# Patient Record
Sex: FEMALE | Race: WHITE | NOT HISPANIC OR LATINO | Employment: FULL TIME | URBAN - METROPOLITAN AREA
[De-identification: names, ages, dates, MRNs, and addresses within clinical notes are randomized per-mention and may not be internally consistent; named-entity substitution may affect disease eponyms.]

---

## 2017-12-13 ENCOUNTER — OFFICE VISIT (OUTPATIENT)
Dept: FAMILY MEDICINE CLINIC | Facility: CLINIC | Age: 27
End: 2017-12-13

## 2017-12-13 VITALS
RESPIRATION RATE: 16 BRPM | SYSTOLIC BLOOD PRESSURE: 106 MMHG | WEIGHT: 191 LBS | HEIGHT: 65 IN | OXYGEN SATURATION: 99 % | TEMPERATURE: 98.5 F | BODY MASS INDEX: 31.82 KG/M2 | DIASTOLIC BLOOD PRESSURE: 70 MMHG | HEART RATE: 68 BPM

## 2017-12-13 DIAGNOSIS — M79.642 BILATERAL HAND PAIN: Primary | ICD-10-CM

## 2017-12-13 DIAGNOSIS — M79.641 BILATERAL HAND PAIN: Primary | ICD-10-CM

## 2017-12-13 PROCEDURE — 99214 OFFICE O/P EST MOD 30 MIN: CPT | Performed by: FAMILY MEDICINE

## 2017-12-13 RX ORDER — MELOXICAM 7.5 MG/1
7.5 TABLET ORAL DAILY
Qty: 30 TABLET | Refills: 0 | Status: SHIPPED | OUTPATIENT
Start: 2017-12-13 | End: 2018-01-22 | Stop reason: SDUPTHER

## 2017-12-13 RX ORDER — ESCITALOPRAM OXALATE 20 MG/1
TABLET ORAL
COMMUNITY
Start: 2017-11-15

## 2017-12-13 RX ORDER — SUCRALFATE 1 G/1
TABLET ORAL
COMMUNITY
Start: 2017-11-11 | End: 2017-12-13

## 2017-12-13 NOTE — PROGRESS NOTES
"Subjective   Nadiya Iglesias is a 27 y.o. female.     History of Present Illness   Chief Complaint:   Chief Complaint   Patient presents with   • Hand Pain     Bilateral       Nadiya Iglesias 27 y.o. female who presents today for bilateral hand pain that has been present for 3-4 weeks. Her pain in the office is a 7 out of 10. She denies injury. She complains of swelling in both hands. It began in her right hand and now it is in both.    Mostly wrist   Will do labs and recheck 2 weeks  she has a problem list of   Patient Active Problem List   Diagnosis   • Fatigue   • LFT elevation   • Infectious mononucleosis   • SOB (shortness of breath)   • SALINAS (dyspnea on exertion)   • Sinus tachycardia   • Acute sinusitis   • Weakness   • Drugs and medicinal substances causing adverse effect in therapeutic use   • Asthma   .  Since the last visit, she has overall felt well.  she has been compliant with   Current Outpatient Prescriptions:   •  escitalopram (LEXAPRO) 20 MG tablet, , Disp: , Rfl:   •  PROAIR  (90 BASE) MCG/ACT inhaler, , Disp: , Rfl:   •  sucralfate (CARAFATE) 1 g tablet, , Disp: , Rfl: .  she denies medication side effects.    All of the chronic condition(s) listed above are stable w/o issues.    /70  Pulse 68  Temp 98.5 °F (36.9 °C) (Oral)   Resp 16  Ht 165.1 cm (65\")  Wt 86.6 kg (191 lb)  SpO2 99%  BMI 31.78 kg/m2    No results found for this or any previous visit.    The following portions of the patient's history were reviewed and updated as appropriate: allergies, current medications, past family history, past medical history, past social history, past surgical history and problem list.    Review of Systems   Constitutional: Negative for activity change, appetite change and unexpected weight change.   Eyes: Negative for visual disturbance.   Respiratory: Negative for chest tightness and shortness of breath.    Cardiovascular: Negative for chest pain and palpitations. "   Musculoskeletal:        Bilateral hand pain with swelling   Skin: Negative for color change.   Neurological: Negative for syncope and speech difficulty.   Psychiatric/Behavioral: Negative for confusion and decreased concentration.       Objective   Physical Exam   HENT:   Head: Normocephalic and atraumatic.   Eyes: EOM are normal. Pupils are equal, round, and reactive to light.   Neck: Normal range of motion. Neck supple.   Abdominal: Soft.   Musculoskeletal: Normal range of motion. She exhibits tenderness. She exhibits no edema or deformity.   Tender both wrists   from   Nursing note and vitals reviewed.      Assessment/Plan   Nadiya was seen today for hand pain and edema.    Diagnoses and all orders for this visit:    Bilateral hand pain  -     Comprehensive Metabolic Panel  -     Uric Acid  -     Rheumatoid Factor, Quant  -     QUYNH  -     Sedimentation Rate  -     meloxicam (MOBIC) 7.5 MG tablet; Take 1 tablet by mouth Daily.

## 2017-12-14 LAB
ALBUMIN SERPL-MCNC: 4.3 G/DL (ref 3.5–5.2)
ALBUMIN/GLOB SERPL: 1.6 G/DL
ALP SERPL-CCNC: 73 U/L (ref 39–117)
ALT SERPL-CCNC: 22 U/L (ref 1–33)
ANA SER QL: NEGATIVE
AST SERPL-CCNC: 21 U/L (ref 1–32)
BILIRUB SERPL-MCNC: 0.4 MG/DL (ref 0.1–1.2)
BUN SERPL-MCNC: 19 MG/DL (ref 6–20)
BUN/CREAT SERPL: 17.9 (ref 7–25)
CALCIUM SERPL-MCNC: 9.6 MG/DL (ref 8.6–10.5)
CHLORIDE SERPL-SCNC: 101 MMOL/L (ref 98–107)
CO2 SERPL-SCNC: 25.2 MMOL/L (ref 22–29)
CREAT SERPL-MCNC: 1.06 MG/DL (ref 0.57–1)
ERYTHROCYTE [SEDIMENTATION RATE] IN BLOOD BY WESTERGREN METHOD: 6 MM/HR (ref 0–20)
GFR SERPLBLD CREATININE-BSD FMLA CKD-EPI: 63 ML/MIN/1.73
GFR SERPLBLD CREATININE-BSD FMLA CKD-EPI: 76 ML/MIN/1.73
GLOBULIN SER CALC-MCNC: 2.7 GM/DL
GLUCOSE SERPL-MCNC: 82 MG/DL (ref 65–99)
POTASSIUM SERPL-SCNC: 4.6 MMOL/L (ref 3.5–5.2)
PROT SERPL-MCNC: 7 G/DL (ref 6–8.5)
RHEUMATOID FACT SERPL-ACNC: <10 IU/ML (ref 0–13.9)
SODIUM SERPL-SCNC: 140 MMOL/L (ref 136–145)
URATE SERPL-MCNC: 3.4 MG/DL (ref 2.4–5.7)

## 2018-01-22 ENCOUNTER — OFFICE VISIT (OUTPATIENT)
Dept: FAMILY MEDICINE CLINIC | Facility: CLINIC | Age: 28
End: 2018-01-22

## 2018-01-22 VITALS
BODY MASS INDEX: 31.82 KG/M2 | WEIGHT: 191 LBS | HEART RATE: 85 BPM | OXYGEN SATURATION: 96 % | SYSTOLIC BLOOD PRESSURE: 104 MMHG | DIASTOLIC BLOOD PRESSURE: 64 MMHG | TEMPERATURE: 98.3 F | HEIGHT: 65 IN | RESPIRATION RATE: 16 BRPM

## 2018-01-22 DIAGNOSIS — M79.641 BILATERAL HAND PAIN: Primary | ICD-10-CM

## 2018-01-22 DIAGNOSIS — J01.01 ACUTE RECURRENT MAXILLARY SINUSITIS: ICD-10-CM

## 2018-01-22 DIAGNOSIS — J45.909 ASTHMA, UNSPECIFIED ASTHMA SEVERITY, UNSPECIFIED WHETHER COMPLICATED, UNSPECIFIED WHETHER PERSISTENT: ICD-10-CM

## 2018-01-22 DIAGNOSIS — M79.642 BILATERAL HAND PAIN: Primary | ICD-10-CM

## 2018-01-22 PROCEDURE — 99214 OFFICE O/P EST MOD 30 MIN: CPT | Performed by: FAMILY MEDICINE

## 2018-01-22 PROCEDURE — 73130 X-RAY EXAM OF HAND: CPT | Performed by: FAMILY MEDICINE

## 2018-01-22 RX ORDER — ALBUTEROL SULFATE 90 UG/1
2 AEROSOL, METERED RESPIRATORY (INHALATION) AS NEEDED
Qty: 1 INHALER | Refills: 2 | Status: SHIPPED | OUTPATIENT
Start: 2018-01-22

## 2018-01-22 RX ORDER — MONTELUKAST SODIUM 10 MG/1
10 TABLET ORAL NIGHTLY
Qty: 30 TABLET | Refills: 3 | Status: SHIPPED | OUTPATIENT
Start: 2018-01-22 | End: 2019-09-16

## 2018-01-22 RX ORDER — MELOXICAM 7.5 MG/1
7.5 TABLET ORAL DAILY
Qty: 30 TABLET | Refills: 2 | Status: SHIPPED | OUTPATIENT
Start: 2018-01-22 | End: 2019-09-16

## 2018-01-22 RX ORDER — CEFDINIR 300 MG/1
300 CAPSULE ORAL 2 TIMES DAILY
Qty: 20 CAPSULE | Refills: 0 | Status: SHIPPED | OUTPATIENT
Start: 2018-01-22 | End: 2019-09-16

## 2018-01-22 NOTE — PROGRESS NOTES
"Subjective   Nadiya Iglesias is a 27 y.o. female.     History of Present Illness   Chief Complaint:   Chief Complaint   Patient presents with   • Hand Pain     follow up   • Asthma       Nadiya Iglesias 27 y.o. female who presents today to follow up bilateral hand and wrist pain with swelling. She stated that is mostly her right hand and wrist now. I reviewed her lab results. I refilled her inhaler for her asthma.  she has a problem list of  Alllergies.  Exam hands neg  Good .  Will x ray hands and can have refill mobic 7.5mg dailyl  Neg exam hands. Reviewed labs  Patient Active Problem List   Diagnosis   • Fatigue   • LFT elevation   • Infectious mononucleosis   • SOB (shortness of breath)   • SALINAS (dyspnea on exertion)   • Sinus tachycardia   • Acute sinusitis   • Weakness   • Drugs and medicinal substances causing adverse effect in therapeutic use   • Asthma   .  Since the last visit, she has overall felt well.  she has been compliant with   Current Outpatient Prescriptions:   •  escitalopram (LEXAPRO) 20 MG tablet, , Disp: , Rfl:   •  meloxicam (MOBIC) 7.5 MG tablet, Take 1 tablet by mouth Daily., Disp: 30 tablet, Rfl: 0  •  PROAIR  (90 BASE) MCG/ACT inhaler, , Disp: , Rfl: .  she denies medication side effects.    All of the chronic condition(s) listed above are stable w/o issues.    /64  Pulse 85  Temp 98.3 °F (36.8 °C) (Oral)   Resp 16  Ht 165.1 cm (65\")  Wt 86.6 kg (191 lb)  SpO2 96%  BMI 31.78 kg/m2    Results for orders placed or performed in visit on 12/13/17   Comprehensive Metabolic Panel   Result Value Ref Range    Glucose 82 65 - 99 mg/dL    BUN 19 6 - 20 mg/dL    Creatinine 1.06 (H) 0.57 - 1.00 mg/dL    eGFR Non African Am 63 >60 mL/min/1.73    eGFR African Am 76 >60 mL/min/1.73    BUN/Creatinine Ratio 17.9 7.0 - 25.0    Sodium 140 136 - 145 mmol/L    Potassium 4.6 3.5 - 5.2 mmol/L    Chloride 101 98 - 107 mmol/L    Total CO2 25.2 22.0 - 29.0 mmol/L    Calcium 9.6 8.6 - " 10.5 mg/dL    Total Protein 7.0 6.0 - 8.5 g/dL    Albumin 4.30 3.50 - 5.20 g/dL    Globulin 2.7 gm/dL    A/G Ratio 1.6 g/dL    Total Bilirubin 0.4 0.1 - 1.2 mg/dL    Alkaline Phosphatase 73 39 - 117 U/L    AST (SGOT) 21 1 - 32 U/L    ALT (SGPT) 22 1 - 33 U/L   Uric Acid   Result Value Ref Range    Uric Acid 3.4 2.4 - 5.7 mg/dL   Rheumatoid Factor, Quant   Result Value Ref Range    RA Latex Turbid <10.0 0.0 - 13.9 IU/mL   QUYNH   Result Value Ref Range    QUYNH Direct Negative Negative   Sedimentation Rate   Result Value Ref Range    Sed Rate 6 0 - 20 mm/hr           The following portions of the patient's history were reviewed and updated as appropriate: allergies, current medications, past family history, past medical history, past social history, past surgical history and problem list.    Review of Systems   Constitutional: Negative for activity change, appetite change and unexpected weight change.   Eyes: Negative for visual disturbance.   Respiratory: Negative for chest tightness and shortness of breath.    Cardiovascular: Negative for chest pain and palpitations.   Musculoskeletal:        Bilateral hand and pain with swelling   Skin: Negative for color change.   Neurological: Negative for syncope and speech difficulty.   Psychiatric/Behavioral: Negative for confusion and decreased concentration.       Objective   Physical Exam   Constitutional: She is oriented to person, place, and time. She appears well-developed and well-nourished.   HENT:   Head: Normocephalic.   Nose: Nose normal.   Mouth/Throat: Oropharynx is clear and moist.   Eyes: Pupils are equal, round, and reactive to light.   Neck: Normal range of motion.   Musculoskeletal: Normal range of motion. She exhibits no edema, tenderness or deformity.   Neurological: She is oriented to person, place, and time.   Skin: Skin is warm and dry. No rash noted.   Psychiatric: She has a normal mood and affect. Her behavior is normal.   Nursing note and vitals  reviewed.  Views: lateral and posterior-anterior    Relevant Clinical Issues/Diagnoses/Indications for XRay: see HPI  Clinical Findings: Extremities: Hand: Negative  right and left    Compared with previous XRay? not applicable    Date of Previous Xray:unknown date    Changes on current Xray? no    Assessment/Plan   Nadiya was seen today for hand pain and asthma.    Diagnoses and all orders for this visit:    Bilateral hand pain  -     meloxicam (MOBIC) 7.5 MG tablet; Take 1 tablet by mouth Daily.  -     XR hand 3+ vw bilateral    Asthma, unspecified asthma severity, unspecified whether complicated, unspecified whether persistent  -     albuterol (PROAIR HFA) 108 (90 Base) MCG/ACT inhaler; Inhale 2 puffs As Needed for Wheezing.    Acute recurrent maxillary sinusitis    Other orders  -     montelukast (SINGULAIR) 10 MG tablet; Take 1 tablet by mouth Every Night.  -     cefdinir (OMNICEF) 300 MG capsule; Take 1 capsule by mouth 2 (Two) Times a Day.

## 2019-05-13 ENCOUNTER — OFFICE VISIT (OUTPATIENT)
Dept: RETAIL CLINIC | Facility: CLINIC | Age: 29
End: 2019-05-13

## 2019-05-13 VITALS
TEMPERATURE: 98.2 F | SYSTOLIC BLOOD PRESSURE: 110 MMHG | OXYGEN SATURATION: 98 % | DIASTOLIC BLOOD PRESSURE: 74 MMHG | HEART RATE: 81 BPM

## 2019-05-13 DIAGNOSIS — R11.0 NAUSEA: ICD-10-CM

## 2019-05-13 DIAGNOSIS — H72.92 PERFORATION OF LEFT TYMPANIC MEMBRANE: Primary | ICD-10-CM

## 2019-05-13 PROCEDURE — 99213 OFFICE O/P EST LOW 20 MIN: CPT | Performed by: NURSE PRACTITIONER

## 2019-05-13 RX ORDER — CEFDINIR 300 MG/1
300 CAPSULE ORAL 2 TIMES DAILY
Qty: 20 CAPSULE | Refills: 0 | Status: SHIPPED | OUTPATIENT
Start: 2019-05-13 | End: 2019-09-16 | Stop reason: SDUPTHER

## 2019-05-13 RX ORDER — CETIRIZINE HYDROCHLORIDE 10 MG/1
10 TABLET ORAL DAILY
COMMUNITY

## 2019-05-13 RX ORDER — ONDANSETRON HYDROCHLORIDE 8 MG/1
8 TABLET, FILM COATED ORAL EVERY 8 HOURS PRN
Qty: 12 TABLET | Refills: 0 | Status: SHIPPED | OUTPATIENT
Start: 2019-05-13 | End: 2019-05-17

## 2019-05-13 NOTE — PROGRESS NOTES
Subjective:     Nadiya Goddard is a 28 y.o.     Earache    There is pain in the left ear. Episode onset: went diving 4 days and heard her left ear pop, has been hurting since. Maximum temperature: unknown. Associated symptoms include diarrhea, ear discharge (left), headaches and neck pain (left). Pertinent negatives include no coughing, rhinorrhea or sore throat. She has tried NSAIDs (swimmer's ear pain, excedrin migraine) for the symptoms. The treatment provided mild relief. Her past medical history is significant for a chronic ear infection and a tympanostomy tube.         The following portions of the patient's history were reviewed and updated as appropriate: allergies, current medications, past family history, past medical history, past social history, past surgical history and problem list.      Review of Systems   HENT: Positive for ear discharge (left) and ear pain. Negative for rhinorrhea and sore throat.    Respiratory: Negative for cough.    Gastrointestinal: Positive for diarrhea.   Musculoskeletal: Positive for neck pain (left).   Neurological: Positive for headaches.         Objective:      Physical Exam   HENT:   Head: Normocephalic and atraumatic.   Right Ear: Tympanic membrane is bulging. Tympanic membrane is not erythematous.   Left Ear: Tympanic membrane is perforated. Tympanic membrane is not erythematous.   Nose: Nose normal.   Cardiovascular: Normal rate, regular rhythm, S1 normal, S2 normal and normal heart sounds.   Pulmonary/Chest: Effort normal and breath sounds normal.   Vitals reviewed.          Diagnoses and all orders for this visit:    Perforation of left tympanic membrane    Nausea    Other orders  -     cefdinir (OMNICEF) 300 MG capsule; Take 1 capsule by mouth 2 (Two) Times a Day.  -     ondansetron (ZOFRAN) 8 MG tablet; Take 1 tablet by mouth Every 8 (Eight) Hours As Needed for Nausea or Vomiting for up to 4 days.    F/U appointment with ENT for 05/16/2019.

## 2019-09-16 ENCOUNTER — OFFICE VISIT (OUTPATIENT)
Dept: FAMILY MEDICINE CLINIC | Facility: CLINIC | Age: 29
End: 2019-09-16

## 2019-09-16 VITALS
RESPIRATION RATE: 16 BRPM | HEIGHT: 65 IN | TEMPERATURE: 99 F | BODY MASS INDEX: 36.49 KG/M2 | DIASTOLIC BLOOD PRESSURE: 79 MMHG | SYSTOLIC BLOOD PRESSURE: 113 MMHG | HEART RATE: 86 BPM | WEIGHT: 219 LBS | OXYGEN SATURATION: 99 %

## 2019-09-16 DIAGNOSIS — J01.80 OTHER ACUTE SINUSITIS, RECURRENCE NOT SPECIFIED: Primary | ICD-10-CM

## 2019-09-16 DIAGNOSIS — T78.40XS ALLERGIC STATE, SEQUELA: ICD-10-CM

## 2019-09-16 DIAGNOSIS — H65.112 ACUTE ALLERGIC SEROUS OTITIS MEDIA OF LEFT EAR: ICD-10-CM

## 2019-09-16 PROBLEM — N94.6 DYSMENORRHEA: Status: ACTIVE | Noted: 2018-08-07

## 2019-09-16 PROCEDURE — 99213 OFFICE O/P EST LOW 20 MIN: CPT | Performed by: FAMILY MEDICINE

## 2019-09-16 RX ORDER — CEFDINIR 300 MG/1
300 CAPSULE ORAL 2 TIMES DAILY
Qty: 20 CAPSULE | Refills: 0 | Status: SHIPPED | OUTPATIENT
Start: 2019-09-16

## 2019-09-16 NOTE — PROGRESS NOTES
Subjective   Chief Complaint:   Chief Complaint   Patient presents with   • Cough         History of Present Illness patient has this URI with allergy symptoms going on since July.  She is used doxycycline and Cipro with no help she is used Zofran also for the nausea.  On exam her throat is normal she is congested she is coughing her chest is clear she has in the left ear some fluid in her eardrum and it looks like a serous otitis but not infected.  Her right ear looks clear her chest is clear somata go ahead and treat her with upper respiratory infection I told her we could get the respiratory viral panel but is still just turns out to be a virus so I think she can just wait if this does not clear and we can do that later.  Somata give her give her singular 10 mg daily #30 and she can use Mucinex D 12-hour over-the-counter and I am to give her Omnicef or cefdinir 300 mg twice daily for 10 days and again I am to give her Singulair as an antihistamine along with Mucinex D12 hour and then she want me to give her a Medrol Dosepak 4 mg and that is fine okay and I may give her Zofran 4 mg p.o. daily as needed nausea with medicines.  And give her #10.  His congestion has a cough some of this could be allergies we will treat with the meds as above and she call me in 2 weeks and let me know if she feels better.'s are clear in her throat and ears are clear except for the left ear is got some serous otitis media.  We discussed the flu shot and she got one last January and told her that she would need another flu shot this year which she is going to get later.          Nadiya Powerjuan 28 y.o. female who presents today for Medical Management of the below listed issues and medication refills.    ICD-10-CM ICD-9-CM   1. Other acute sinusitis, recurrence not specified J01.80 461.8   2. Acute allergic serous otitis media of left ear H65.112 381.04   3. Allergic state, sequela T78.40XS 909.9    Labs results discussed in detail with the  "patient, if no recent labs were done, order placed today.  Plan to update vaccines if needed today.  I  reviewed health maintenance with the patient as part of my preventative care plan. I discussed preventative counseling with the patient in detail.     she has a problem list of   Patient Active Problem List   Diagnosis   • Fatigue   • LFT elevation   • Infectious mononucleosis   • SOB (shortness of breath)   • SALINAS (dyspnea on exertion)   • Sinus tachycardia   • Acute sinusitis   • Weakness   • Drugs and medicinal substances causing adverse effect in therapeutic use   • Asthma   • Dysmenorrhea   .    she has been compliant with   Current Outpatient Medications:   •  cetirizine (zyrTEC) 10 MG tablet, Take 10 mg by mouth Daily., Disp: , Rfl:   •  escitalopram (LEXAPRO) 20 MG tablet, , Disp: , Rfl:   •  albuterol (PROAIR HFA) 108 (90 Base) MCG/ACT inhaler, Inhale 2 puffs As Needed for Wheezing., Disp: 1 inhaler, Rfl: 2  •  cefdinir (OMNICEF) 300 MG capsule, Take 1 capsule by mouth 2 (Two) Times a Day., Disp: 20 capsule, Rfl: 0  •  montelukast (SINGULAIR) 10 MG tablet, Take 1 tablet by mouth Every Night., Disp: 30 tablet, Rfl: 0.  she denies medication side effects.        /79   Pulse 86   Temp 99 °F (37.2 °C)   Resp 16   Ht 165.1 cm (65\")   Wt 99.3 kg (219 lb)   SpO2 99%   BMI 36.44 kg/m²     Results for orders placed or performed in visit on 12/13/17   Comprehensive Metabolic Panel   Result Value Ref Range    Glucose 82 65 - 99 mg/dL    BUN 19 6 - 20 mg/dL    Creatinine 1.06 (H) 0.57 - 1.00 mg/dL    eGFR Non African Am 63 >60 mL/min/1.73    eGFR African Am 76 >60 mL/min/1.73    BUN/Creatinine Ratio 17.9 7.0 - 25.0    Sodium 140 136 - 145 mmol/L    Potassium 4.6 3.5 - 5.2 mmol/L    Chloride 101 98 - 107 mmol/L    Total CO2 25.2 22.0 - 29.0 mmol/L    Calcium 9.6 8.6 - 10.5 mg/dL    Total Protein 7.0 6.0 - 8.5 g/dL    Albumin 4.30 3.50 - 5.20 g/dL    Globulin 2.7 gm/dL    A/G Ratio 1.6 g/dL    Total " Bilirubin 0.4 0.1 - 1.2 mg/dL    Alkaline Phosphatase 73 39 - 117 U/L    AST (SGOT) 21 1 - 32 U/L    ALT (SGPT) 22 1 - 33 U/L   Uric Acid   Result Value Ref Range    Uric Acid 3.4 2.4 - 5.7 mg/dL   Rheumatoid Factor, Quant   Result Value Ref Range    RA Latex Turbid <10.0 0.0 - 13.9 IU/mL   QUYNH   Result Value Ref Range    QUYNH Direct Negative Negative   Sedimentation Rate   Result Value Ref Range    Sed Rate 6 0 - 20 mm/hr             The following portions of the patient's history were reviewed and updated as appropriate: allergies, current medications, past family history, past medical history, past social history, past surgical history and problem list.    Review of Systems   Constitutional: Negative for activity change, appetite change and unexpected weight change.   HENT: Positive for congestion. Negative for ear discharge.    Eyes: Negative for visual disturbance.   Respiratory: Negative for chest tightness and shortness of breath.    Cardiovascular: Negative for chest pain and palpitations.   Gastrointestinal: Negative for abdominal pain.   Musculoskeletal: Negative for arthralgias and back pain.   Skin: Negative for color change.   Neurological: Negative for syncope and speech difficulty.   Psychiatric/Behavioral: Negative for confusion and decreased concentration.       Objective   Physical Exam   Constitutional: She is oriented to person, place, and time. She appears well-developed and well-nourished.   HENT:   Right Ear: External ear normal.   Left Ear: External ear normal.   Mouth/Throat: Oropharynx is clear and moist.   There is otitis present in the left ear   Eyes: Pupils are equal, round, and reactive to light.   Cardiovascular: Normal rate and regular rhythm.   Pulmonary/Chest: Effort normal and breath sounds normal.   Abdominal: Soft. Bowel sounds are normal.   Neurological: She is alert and oriented to person, place, and time.   Psychiatric: She has a normal mood and affect. Her behavior is normal.    Nursing note and vitals reviewed.      Assessment/Plan   Nadiya was seen today for cough.    Diagnoses and all orders for this visit:    Other acute sinusitis, recurrence not specified  Comments:       allergy related    Acute allergic serous otitis media of left ear    Allergic state, sequela    Other orders  -     cefdinir (OMNICEF) 300 MG capsule; Take 1 capsule by mouth 2 (Two) Times a Day.  -     montelukast (SINGULAIR) 10 MG tablet; Take 1 tablet by mouth Every Night.                 -Labs results discussed in detail with the patient, if no recent labs were done, order placed today.  Plan to update vaccines if needed today.  I  reviewed health maintenance with the patient as part of my preventative care plan. I discussed preventative counseling with the patient in detail.

## 2019-09-22 RX ORDER — MONTELUKAST SODIUM 10 MG/1
10 TABLET ORAL NIGHTLY
Qty: 30 TABLET | Refills: 0 | Status: SHIPPED | OUTPATIENT
Start: 2019-09-22

## 2023-12-11 ENCOUNTER — OFFICE VISIT (OUTPATIENT)
Dept: FAMILY MEDICINE CLINIC | Facility: CLINIC | Age: 33
End: 2023-12-11
Payer: COMMERCIAL

## 2023-12-11 VITALS
HEIGHT: 65 IN | DIASTOLIC BLOOD PRESSURE: 64 MMHG | RESPIRATION RATE: 16 BRPM | HEART RATE: 82 BPM | BODY MASS INDEX: 45.32 KG/M2 | OXYGEN SATURATION: 97 % | SYSTOLIC BLOOD PRESSURE: 108 MMHG | WEIGHT: 272 LBS

## 2023-12-11 DIAGNOSIS — R53.83 OTHER FATIGUE: ICD-10-CM

## 2023-12-11 DIAGNOSIS — E66.01 MORBID (SEVERE) OBESITY DUE TO EXCESS CALORIES: Primary | ICD-10-CM

## 2023-12-11 DIAGNOSIS — J30.9 ALLERGIC RHINITIS, UNSPECIFIED SEASONALITY, UNSPECIFIED TRIGGER: ICD-10-CM

## 2023-12-11 DIAGNOSIS — R73.09 ELEVATED GLUCOSE: ICD-10-CM

## 2023-12-11 DIAGNOSIS — J45.909 ASTHMA, UNSPECIFIED ASTHMA SEVERITY, UNSPECIFIED WHETHER COMPLICATED, UNSPECIFIED WHETHER PERSISTENT: ICD-10-CM

## 2023-12-11 RX ORDER — FLUTICASONE PROPIONATE 50 MCG
2 SPRAY, SUSPENSION (ML) NASAL DAILY
Qty: 16 G | Refills: 2 | Status: SHIPPED | OUTPATIENT
Start: 2023-12-11

## 2023-12-11 RX ORDER — MONTELUKAST SODIUM 10 MG/1
10 TABLET ORAL NIGHTLY
Qty: 30 TABLET | Refills: 0 | Status: SHIPPED | OUTPATIENT
Start: 2023-12-11

## 2023-12-11 NOTE — PROGRESS NOTES
Chief Complaint  discuss weight loss surgery    Carolyn Hearn presents to [facility] as a 33-year-old female to establish care and to discuss referral to bariatric/weight loss management    She has been working hard on diet and exercise and weight.  She would like referral today to both bariatric surgery to discuss weight loss surgery/management  She is also agreeable to referral to nutritional services to discuss further options for diet and exercise    She is also agreeable to labs      Anxiety-has been controlled for several years on Lexapro.  She takes her medication as directed.  She denies any medication side effects.  She denies any SI or HI.  She plans to continue current dose.  She is not currently in counseling.      She does have ongoing intermittent moderate asthma-has albuterol inhaler no recent albuterol use changes  She does have chronic allergic rhinitis.  She does use Afrin generic pseudoephedrine nasal spray  She has tried several different medications in the past  She does continue to take Zyrtec-she has not been taking montelukast  She has recently been on both Omnicef and Augmentin with no changes  Chronic congestion and alternating rhinitis/sinus pain pressure      She has had ongoing fatigue especially since postdelivery 1/13/2023 she did have gestational diabetes while pregnant no recent labs      She denies any other significant personal or family medical history      Review of Systems   Constitutional:  Positive for fatigue. Negative for chills and fever.   Eyes:  Negative for visual disturbance.   Respiratory:  Negative for cough, shortness of breath and wheezing.    Cardiovascular:  Negative for chest pain, palpitations and leg swelling.   Gastrointestinal:  Negative for diarrhea, nausea and vomiting.   Skin:  Negative for rash.   Neurological:  Negative for dizziness and light-headedness.   Psychiatric/Behavioral:  Negative for self-injury and  "suicidal ideas.         Objective   Vital Signs:   Vitals:    12/11/23 1119   BP: 108/64   Pulse: 82   Resp: 16   SpO2: 97%   Weight: 123 kg (272 lb)   Height: 165.1 cm (65\")        Class 3 Severe Obesity (BMI >=40). Obesity-related health conditions include the following: none. Obesity is unchanged. BMI is is above average; BMI management plan is completed. We discussed portion control and increasing exercise.        Physical Exam  Vitals reviewed.   Constitutional:       General: She is not in acute distress.  Eyes:      Conjunctiva/sclera: Conjunctivae normal.   Neck:      Thyroid: No thyromegaly.      Vascular: No carotid bruit.   Cardiovascular:      Rate and Rhythm: Normal rate and regular rhythm.      Heart sounds: Normal heart sounds.   Pulmonary:      Effort: Pulmonary effort is normal. No respiratory distress.      Breath sounds: Normal breath sounds. No stridor. No wheezing, rhonchi or rales.   Chest:      Chest wall: No tenderness.   Abdominal:      General: Abdomen is flat. There is no distension.      Palpations: Abdomen is soft. There is no mass.      Tenderness: There is no abdominal tenderness. There is no right CVA tenderness, left CVA tenderness, guarding or rebound.      Hernia: No hernia is present.   Musculoskeletal:      Cervical back: Neck supple.   Lymphadenopathy:      Cervical: No cervical adenopathy.   Neurological:      Mental Status: She is alert.   Psychiatric:         Attention and Perception: Attention normal.         Mood and Affect: Mood normal.          Result Review :     The following data was reviewed by: LV Santos on 12/11/2023:           Assessment and Plan    Diagnoses and all orders for this visit:    1. Morbid (severe) obesity due to excess calories (Primary)  Comments:  Checking labs today  Referral to nutritional services and bariatric team  Continue to work on diet and exercise  Orders:  -     Ambulatory Referral to Bariatric Surgery  -     Ambulatory " Referral to Nutrition Services  -     Comprehensive Metabolic Panel  -     CBC & Differential  -     Lipid Panel  -     T4, Free  -     TSH  -     T3  -     Hemoglobin A1c    2. Other fatigue  Comments:  Checking labs including H&H, thyroid  Orders:  -     Comprehensive Metabolic Panel  -     CBC & Differential  -     Lipid Panel  -     T4, Free  -     TSH  -     T3  -     Hemoglobin A1c    3. Asthma, unspecified asthma severity, unspecified whether complicated, unspecified whether persistent  Comments:  Report any increased albuterol use restart montelukast  Orders:  -     montelukast (Singulair) 10 MG tablet; Take 1 tablet by mouth Every Night.  Dispense: 30 tablet; Refill: 0  -     Comprehensive Metabolic Panel  -     CBC & Differential  -     Lipid Panel  -     T4, Free  -     TSH  -     T3  -     Hemoglobin A1c    4. Allergic rhinitis, unspecified seasonality, unspecified trigger  Comments:  Switch from Afrin/pseudoephedrine to Flonase to prevent rebound congestion  Orders:  -     fluticasone (FLONASE) 50 MCG/ACT nasal spray; 2 sprays into the nostril(s) as directed by provider Daily.  Dispense: 16 g; Refill: 2  -     montelukast (Singulair) 10 MG tablet; Take 1 tablet by mouth Every Night.  Dispense: 30 tablet; Refill: 0  -     Comprehensive Metabolic Panel  -     CBC & Differential  -     Lipid Panel  -     T4, Free  -     TSH  -     T3  -     Hemoglobin A1c    5. Elevated glucose  Comments:  Recheck hemoglobin A1c and glucose with labs  Watch carb and sugar intake  Gestational diabetes with last pregnancy  Orders:  -     Comprehensive Metabolic Panel  -     Hemoglobin A1c        Follow Up   Return in about 6 months (around 6/11/2024) for Annual physical.  Patient was given instructions and counseling regarding her condition or for health maintenance advice. Please see specific information pulled into the AVS if appropriate.

## 2023-12-13 LAB
ALBUMIN SERPL-MCNC: 4.2 G/DL (ref 3.5–5.2)
ALBUMIN/GLOB SERPL: 2 G/DL
ALP SERPL-CCNC: 100 U/L (ref 39–117)
ALT SERPL-CCNC: 18 U/L (ref 1–33)
AST SERPL-CCNC: 23 U/L (ref 1–32)
BASOPHILS # BLD AUTO: 0.04 10*3/MM3 (ref 0–0.2)
BASOPHILS NFR BLD AUTO: 0.6 % (ref 0–1.5)
BILIRUB SERPL-MCNC: 0.2 MG/DL (ref 0–1.2)
BUN SERPL-MCNC: 13 MG/DL (ref 6–20)
BUN/CREAT SERPL: 14.8 (ref 7–25)
CALCIUM SERPL-MCNC: 9.5 MG/DL (ref 8.6–10.5)
CHLORIDE SERPL-SCNC: 105 MMOL/L (ref 98–107)
CHOLEST SERPL-MCNC: 146 MG/DL (ref 0–200)
CO2 SERPL-SCNC: 27.6 MMOL/L (ref 22–29)
CREAT SERPL-MCNC: 0.88 MG/DL (ref 0.57–1)
EGFRCR SERPLBLD CKD-EPI 2021: 89.7 ML/MIN/1.73
EOSINOPHIL # BLD AUTO: 0.44 10*3/MM3 (ref 0–0.4)
EOSINOPHIL NFR BLD AUTO: 6.2 % (ref 0.3–6.2)
ERYTHROCYTE [DISTWIDTH] IN BLOOD BY AUTOMATED COUNT: 12.2 % (ref 12.3–15.4)
GLOBULIN SER CALC-MCNC: 2.1 GM/DL
GLUCOSE SERPL-MCNC: 79 MG/DL (ref 65–99)
HBA1C MFR BLD: 5.4 % (ref 4.8–5.6)
HCT VFR BLD AUTO: 41.4 % (ref 34–46.6)
HDLC SERPL-MCNC: 39 MG/DL (ref 40–60)
HGB BLD-MCNC: 13.3 G/DL (ref 12–15.9)
IMM GRANULOCYTES # BLD AUTO: 0.02 10*3/MM3 (ref 0–0.05)
IMM GRANULOCYTES NFR BLD AUTO: 0.3 % (ref 0–0.5)
LDLC SERPL CALC-MCNC: 71 MG/DL (ref 0–100)
LYMPHOCYTES # BLD AUTO: 2.71 10*3/MM3 (ref 0.7–3.1)
LYMPHOCYTES NFR BLD AUTO: 38.1 % (ref 19.6–45.3)
MCH RBC QN AUTO: 28.6 PG (ref 26.6–33)
MCHC RBC AUTO-ENTMCNC: 32.1 G/DL (ref 31.5–35.7)
MCV RBC AUTO: 89 FL (ref 79–97)
MONOCYTES # BLD AUTO: 0.56 10*3/MM3 (ref 0.1–0.9)
MONOCYTES NFR BLD AUTO: 7.9 % (ref 5–12)
NEUTROPHILS # BLD AUTO: 3.35 10*3/MM3 (ref 1.7–7)
NEUTROPHILS NFR BLD AUTO: 46.9 % (ref 42.7–76)
NRBC BLD AUTO-RTO: 0 /100 WBC (ref 0–0.2)
PLATELET # BLD AUTO: 328 10*3/MM3 (ref 140–450)
POTASSIUM SERPL-SCNC: 4.7 MMOL/L (ref 3.5–5.2)
PROT SERPL-MCNC: 6.3 G/DL (ref 6–8.5)
RBC # BLD AUTO: 4.65 10*6/MM3 (ref 3.77–5.28)
SODIUM SERPL-SCNC: 143 MMOL/L (ref 136–145)
T3 SERPL-MCNC: 116 NG/DL (ref 80–200)
T4 FREE SERPL-MCNC: 0.99 NG/DL (ref 0.93–1.7)
TRIGL SERPL-MCNC: 220 MG/DL (ref 0–150)
TSH SERPL DL<=0.005 MIU/L-ACNC: 1.3 UIU/ML (ref 0.27–4.2)
VLDLC SERPL CALC-MCNC: 36 MG/DL (ref 5–40)
WBC # BLD AUTO: 7.12 10*3/MM3 (ref 3.4–10.8)

## 2023-12-15 ENCOUNTER — PATIENT ROUNDING (BHMG ONLY) (OUTPATIENT)
Dept: FAMILY MEDICINE CLINIC | Facility: CLINIC | Age: 33
End: 2023-12-15
Payer: COMMERCIAL

## 2023-12-15 NOTE — PROGRESS NOTES
A My-Chart message has been sent to the patient for [place] [facility]

## 2023-12-28 ENCOUNTER — HOSPITAL ENCOUNTER (OUTPATIENT)
Dept: DIABETES SERVICES | Facility: HOSPITAL | Age: 33
Discharge: HOME | End: 2023-12-28
Payer: COMMERCIAL

## 2023-12-28 PROCEDURE — 97802 MEDICAL NUTRITION INDIV IN: CPT

## 2023-12-28 NOTE — CONSULTS
Nadiya met with Registered Dietitian for MNT/diet education for weight loss. A comprehensive assessment/training record has been sent to medical records (see media tab).    Is looking into Bariatric surgery. Spouse in attendance and supportive. Works full time and has an additional job at an [employer]. Reports she used to do crossfit but has issues with exercise induced asthma. Encouraged working towards 150 minutes of moderate activity and breaking up into smaller intervals. Macronutrient needs reviewed. Encouraged limiting saturated fats and added sugars. Encouraged using nutrition information when going out to eat. Wants to focus on meal planning and prepping.     Nadiya has been encouraged to call our office with questions or additional education needs. Please place referral for additional services or follow-up should need arise.     Thank you for the referral.

## 2024-01-06 DIAGNOSIS — J45.909 ASTHMA, UNSPECIFIED ASTHMA SEVERITY, UNSPECIFIED WHETHER COMPLICATED, UNSPECIFIED WHETHER PERSISTENT: ICD-10-CM

## 2024-01-06 DIAGNOSIS — J30.9 ALLERGIC RHINITIS, UNSPECIFIED SEASONALITY, UNSPECIFIED TRIGGER: ICD-10-CM

## 2024-01-10 RX ORDER — MONTELUKAST SODIUM 10 MG/1
10 TABLET ORAL NIGHTLY
Qty: 60 TABLET | Refills: 0 | Status: SHIPPED | OUTPATIENT
Start: 2024-01-10

## 2024-03-17 DIAGNOSIS — J45.909 ASTHMA, UNSPECIFIED ASTHMA SEVERITY, UNSPECIFIED WHETHER COMPLICATED, UNSPECIFIED WHETHER PERSISTENT: ICD-10-CM

## 2024-03-17 DIAGNOSIS — J30.9 ALLERGIC RHINITIS, UNSPECIFIED SEASONALITY, UNSPECIFIED TRIGGER: ICD-10-CM

## 2024-03-19 RX ORDER — MONTELUKAST SODIUM 10 MG/1
10 TABLET ORAL NIGHTLY
Qty: 30 TABLET | Refills: 1 | Status: SHIPPED | OUTPATIENT
Start: 2024-03-19

## 2024-03-25 ENCOUNTER — OFFICE VISIT (OUTPATIENT)
Dept: FAMILY MEDICINE CLINIC | Facility: CLINIC | Age: 34
End: 2024-03-25
Payer: COMMERCIAL

## 2024-03-25 VITALS
WEIGHT: 273 LBS | HEART RATE: 69 BPM | OXYGEN SATURATION: 98 % | HEIGHT: 65 IN | SYSTOLIC BLOOD PRESSURE: 106 MMHG | DIASTOLIC BLOOD PRESSURE: 78 MMHG | BODY MASS INDEX: 45.48 KG/M2 | TEMPERATURE: 98.2 F

## 2024-03-25 DIAGNOSIS — J30.9 ALLERGIC RHINITIS, UNSPECIFIED SEASONALITY, UNSPECIFIED TRIGGER: ICD-10-CM

## 2024-03-25 DIAGNOSIS — E66.01 CLASS 3 SEVERE OBESITY DUE TO EXCESS CALORIES WITHOUT SERIOUS COMORBIDITY WITH BODY MASS INDEX (BMI) OF 45.0 TO 49.9 IN ADULT: Primary | ICD-10-CM

## 2024-03-25 DIAGNOSIS — J45.909 ASTHMA, UNSPECIFIED ASTHMA SEVERITY, UNSPECIFIED WHETHER COMPLICATED, UNSPECIFIED WHETHER PERSISTENT: ICD-10-CM

## 2024-03-25 DIAGNOSIS — F41.9 ANXIETY: ICD-10-CM

## 2024-03-25 PROCEDURE — 99214 OFFICE O/P EST MOD 30 MIN: CPT | Performed by: NURSE PRACTITIONER

## 2024-03-25 RX ORDER — SEMAGLUTIDE 0.25 MG/.5ML
0.25 INJECTION, SOLUTION SUBCUTANEOUS WEEKLY
Qty: 2 ML | Refills: 2 | Status: SHIPPED | OUTPATIENT
Start: 2024-03-25

## 2024-03-25 RX ORDER — SEMAGLUTIDE 0.25 MG/.5ML
0.25 INJECTION, SOLUTION SUBCUTANEOUS WEEKLY
Qty: 2 ML | Refills: 2 | Status: SHIPPED | OUTPATIENT
Start: 2024-03-25 | End: 2024-03-25

## 2024-03-25 NOTE — PROGRESS NOTES
Chief Complaint  Weight Loss    Subjective        HPI   Nadiya presents to [facility] as a 33 year old female for follow up with review labs, discuss medications.  Overall doing well     She has been working hard on diet and exercise and weight.  She has been referred  to both bariatric surgery to discuss weight loss surgery/management  Her insurance only covers a portion of the bariatric surgery, so she would like to try wegovy if approved prior to that option  No personal or family history of thyroid Ca  No hx pancreatitis       Anxiety-has been controlled for several years on Lexapro.  She takes her medication as directed.  She denies any medication side effects.  She denies any SI or HI.  She plans to continue current dose.  She is not currently in counseling.        She does have ongoing intermittent moderate asthma-has albuterol inhaler no recent albuterol use changes  She does have chronic allergic rhinitis.  She does use Afrin generic pseudoephedrine nasal spray  She has tried several different medications in the past  She does continue to take Zyrtec-she has not been taking montelukast          She has no other acute C/o today    The following portions of the patient's history were reviewed and updated as appropriate: allergies, current medications, past family history, past medical history, past social history, past surgical history, and problem list         Review of Systems   Constitutional:  Negative for chills, fatigue and fever.   Eyes:  Negative for visual disturbance.   Respiratory:  Negative for cough, shortness of breath, wheezing and stridor.    Cardiovascular:  Negative for chest pain, palpitations and leg swelling.   Gastrointestinal:  Negative for abdominal pain, constipation, diarrhea, nausea and vomiting.   Neurological:  Negative for dizziness.   Psychiatric/Behavioral:  Negative for self-injury, sleep disturbance and suicidal ideas. The patient is not  "nervous/anxious.         Objective   Vital Signs:   Vitals:    03/25/24 1140   BP: 106/78   Pulse: 69   Temp: 98.2 °F (36.8 °C)   SpO2: 98%   Weight: 124 kg (273 lb)   Height: 165.1 cm (65\")                       Physical Exam  Vitals reviewed.   Constitutional:       General: She is not in acute distress.  Eyes:      Conjunctiva/sclera: Conjunctivae normal.   Neck:      Thyroid: No thyromegaly.      Vascular: No carotid bruit.   Cardiovascular:      Rate and Rhythm: Normal rate and regular rhythm.      Heart sounds: Normal heart sounds. No murmur heard.  Pulmonary:      Effort: Pulmonary effort is normal. No respiratory distress.      Breath sounds: Normal breath sounds. No stridor. No wheezing, rhonchi or rales.   Chest:      Chest wall: No tenderness.   Abdominal:      General: Abdomen is flat. Bowel sounds are normal. There is no distension.      Palpations: Abdomen is soft. There is no mass.      Tenderness: There is no abdominal tenderness. There is no right CVA tenderness, left CVA tenderness, guarding or rebound.      Hernia: No hernia is present.   Lymphadenopathy:      Cervical: No cervical adenopathy.   Neurological:      Mental Status: She is alert and oriented to person, place, and time.   Psychiatric:         Attention and Perception: Attention normal.         Mood and Affect: Mood normal.          Result Review :     The following data was reviewed by: LV Santos on 03/25/2024:      Hemoglobin A1c (12/11/2023 14:04)  T3 (12/11/2023 14:04)  TSH (12/11/2023 14:04)  T4, Free (12/11/2023 14:04)  Lipid Panel (12/11/2023 14:04)  CBC & Differential (12/11/2023 14:04)  Comprehensive Metabolic Panel (12/11/2023 14:04)   Your blood sugar looks normal, you are not anemic, your kidney and liver enzymes look normal, your thyroid is normal     Your cholesterol/triglycerides are just slightly elevated  Continue to work on lower cholesterol diet and exercise as tolerated   Continue lifestyle modifications " for high cholesterol.  Decrease/eliminate soda, caffeine, alcohol and overall caloric intake. Reduce carbohydrates and sweets in diet.  Continue to improve dietary habits with lean proteins, fresh vegetables, fruits, and nuts. Improve aerobic exercise: walking/biking/swimming daily as tolerated, recommend 30 minutes/day at least.     We will continue to watch this annually     No other major concerns with your labs  Assessment and Plan    Assessment & Plan  Class 3 severe obesity due to excess calories without serious comorbidity with body mass index (BMI) of 45.0 to 49.9 in adult  Patient's (Body mass index is 45.43 kg/m².) indicates that they are morbidly/severely obese (BMI > 40 or > 35 with obesity - related health condition) with health conditions that include none . Weight is unchanged. BMI  is above average; BMI management plan is completed. We discussed portion control and increasing exercise.   Asthma, unspecified asthma severity, unspecified whether complicated, unspecified whether persistent    Continue albuterol-  report any increased use      Anxiety  Controlled-  continue current management  Allergic rhinitis, unspecified seasonality, unspecified trigger  Continue Zyrtec  Follow up with any changes       New Medications Ordered This Visit   Medications    Semaglutide-Weight Management (Wegovy) 0.25 MG/0.5ML solution auto-injector     Sig: Inject 0.5 mL under the skin into the appropriate area as directed 1 (One) Time Per Week.     Dispense:  2 mL     Refill:  2    Semaglutide-Weight Management (Wegovy) 0.25 MG/0.5ML solution auto-injector     Sig: Inject 0.5 mL under the skin into the appropriate area as directed 1 (One) Time Per Week.     Dispense:  2 mL     Refill:  2          Follow Up   Return in about 6 months (around 9/25/2024) for Annual physical.  Patient was given instructions and counseling regarding her condition or for health maintenance advice. Please see specific information pulled into  the AVS if appropriate.

## 2024-04-01 DIAGNOSIS — J45.909 ASTHMA, UNSPECIFIED ASTHMA SEVERITY, UNSPECIFIED WHETHER COMPLICATED, UNSPECIFIED WHETHER PERSISTENT: ICD-10-CM

## 2024-04-01 DIAGNOSIS — J30.9 ALLERGIC RHINITIS, UNSPECIFIED SEASONALITY, UNSPECIFIED TRIGGER: ICD-10-CM

## 2024-04-01 RX ORDER — MONTELUKAST SODIUM 10 MG/1
10 TABLET ORAL NIGHTLY
Qty: 90 TABLET | Refills: 1 | Status: SHIPPED | OUTPATIENT
Start: 2024-04-01

## 2024-04-09 ENCOUNTER — TELEPHONE (OUTPATIENT)
Dept: FAMILY MEDICINE CLINIC | Facility: CLINIC | Age: 34
End: 2024-04-09
Payer: COMMERCIAL

## 2024-04-09 DIAGNOSIS — E66.01 CLASS 3 SEVERE OBESITY DUE TO EXCESS CALORIES WITHOUT SERIOUS COMORBIDITY WITH BODY MASS INDEX (BMI) OF 45.0 TO 49.9 IN ADULT: Primary | ICD-10-CM

## 2024-04-09 RX ORDER — SEMAGLUTIDE 0.25 MG/.5ML
0.25 INJECTION, SOLUTION SUBCUTANEOUS WEEKLY
Qty: 2 ML | Refills: 1 | Status: SHIPPED | OUTPATIENT
Start: 2024-04-09

## 2024-04-09 NOTE — TELEPHONE ENCOUNTER
Caller: Nadiya Goddard    Relationship: Self    Best call back number: [phone]     What was the call regarding: PATIENT WAS ADVISED BY CHEYENNE EARLY THAT IF SHE COULDN'T FIND HER PRESCRIBED WEGOVY THAT THERE WAS A COMPOUND PHARMACY SHE COULD SEND IT TO

## 2024-04-09 NOTE — TELEPHONE ENCOUNTER
HUB TO RELAY      I sent to  [facility],  but as a reminder- they do not take insurance and it will start at $99/month and increase with dose changes.  They will call you to set up payment and shipping information

## 2024-04-09 NOTE — TELEPHONE ENCOUNTER
I sent to  [facility],  but as a reminder- they do not take insurance and it will start at $99/month and increase with dose changes.  They will call you to set up payment and shipping information

## 2024-04-10 ENCOUNTER — PRIOR AUTHORIZATION (OUTPATIENT)
Dept: FAMILY MEDICINE CLINIC | Facility: CLINIC | Age: 34
End: 2024-04-10
Payer: COMMERCIAL

## 2024-04-10 NOTE — TELEPHONE ENCOUNTER
Nadiya Goddard (Key: [id number])  Rx #: [id number]  Wegovy 0.25MG/0.5ML auto-injectors     Form  [employer] Electronic Prior Authorization Form (2017 NCPDP)

## 2024-05-16 ENCOUNTER — TELEPHONE (OUTPATIENT)
Dept: FAMILY MEDICINE CLINIC | Facility: CLINIC | Age: 34
End: 2024-05-16
Payer: COMMERCIAL

## 2024-05-16 DIAGNOSIS — E66.01 CLASS 3 SEVERE OBESITY DUE TO EXCESS CALORIES WITHOUT SERIOUS COMORBIDITY WITH BODY MASS INDEX (BMI) OF 45.0 TO 49.9 IN ADULT: Primary | ICD-10-CM

## 2024-05-16 NOTE — TELEPHONE ENCOUNTER
"  Caller: Nadiya Goddard    Relationship: Self    Best call back number: [phone]    What medication are you requesting: WEGOVY COMPOUND .75 OR 1.0    Have you had these symptoms before:    [x] Yes  [] No    Have you been treated for these symptoms before:   [x] Yes  [] No    If a prescription is needed, what is your preferred pharmacy and phone number: [facility] \"COMPOUNDS ONLY\" - [place], [place] - [facility] [facility] [phone] [facility] [phone]      Additional notes: STATED THAT THE CURRENT DOSE THEY WERE ON ONLY HELPED FOR ABOUT 2 WEEKS. STATED THAT THEY HAD INCREASE TO THE 0.5 THEMSELVES AND IT HAS NOT HELPED MUCH EITHER. STATED THAT THEY WERE INFORMED BY THE PHARMACY THAT A NEW SCRIPT WOULD HAVE TO BE SENT WITH THE HIGHER DOSAGE. STATED THAT THEY HAVE ABOUT A WEEK OR 2 REMAINING. PLEASE CALL AND ADVISE     "

## 2024-05-16 NOTE — TELEPHONE ENCOUNTER
Spoke with patient to inform her per   her provider Shanice Henriquez  Sent in 1 mg to take weekly  Do not increase dose without letting me   know     Shanice      Patient verbalized understanding and stated she would comply

## 2024-07-25 DIAGNOSIS — E66.01 CLASS 3 SEVERE OBESITY DUE TO EXCESS CALORIES WITHOUT SERIOUS COMORBIDITY WITH BODY MASS INDEX (BMI) OF 45.0 TO 49.9 IN ADULT: ICD-10-CM

## 2024-07-25 NOTE — TELEPHONE ENCOUNTER
Last visit: 3-25-24  Next visit: Return in about 6 months (around 9/25/2024) for Annual physical.

## 2024-07-29 ENCOUNTER — TELEPHONE (OUTPATIENT)
Dept: FAMILY MEDICINE CLINIC | Facility: CLINIC | Age: 34
End: 2024-07-29

## 2024-07-29 DIAGNOSIS — E66.01 CLASS 3 SEVERE OBESITY DUE TO EXCESS CALORIES WITHOUT SERIOUS COMORBIDITY WITH BODY MASS INDEX (BMI) OF 45.0 TO 49.9 IN ADULT: ICD-10-CM

## 2024-07-29 NOTE — TELEPHONE ENCOUNTER
Caller: Nadiya Goddard    Relationship: Self    Best call back number: [phone]     What was the call regarding: Semaglutide-Weight Management 1 MG/0.5ML solution auto-injector     PATIENT STATED THAT THE MEDICATION IS STARTING TO NOT BE AS EFFECTIVE AND SHE IS ASKING IF SHE WOULD RECOMMEND GOING UP IN DOSE. PATIENT IS CURRENTLY ON 1 MG. PLEASE ADVISE.

## 2024-07-29 NOTE — TELEPHONE ENCOUNTER
"    Caller: Nadiya Goddard    Relationship: Self    Best call back number: [phone]     Requested Prescriptions:   Requested Prescriptions     Pending Prescriptions Disp Refills    Semaglutide-Weight Management 1 MG/0.5ML solution auto-injector 2 mL 1     Sig: Inject 0.5 mL under the skin into the appropriate area as directed 1 (One) Time Per Week.        Pharmacy where request should be sent: [facility] \"COMPOUNDS ONLY\" - [place], [place] - [address] [facility] [phone] [facility] [phone]      Last office visit with prescribing clinician: 3/25/2024   Last telemedicine visit with prescribing clinician: Visit date not found   Next office visit with prescribing clinician: Visit date not found     Additional details provided by patient: PATIENT IS OUT OF MEDICATION AND SUPPOSED TO TAKE IT TODAY. MEDICATION WAS SENT TO THE WRONG PHARMACY. PLEASE ADVISE.     Does the patient have less than a 3 day supply:  [x] Yes  [] No    Would you like a call back once the refill request has been completed: [] Yes [x] No    If the office needs to give you a call back, can they leave a voicemail: [] Yes [x] No    January Zayas Rep   07/29/24 14:02 EDT       "

## 2024-07-30 ENCOUNTER — PRIOR AUTHORIZATION (OUTPATIENT)
Dept: FAMILY MEDICINE CLINIC | Facility: CLINIC | Age: 34
End: 2024-07-30
Payer: COMMERCIAL

## 2024-07-30 NOTE — TELEPHONE ENCOUNTER
Nadiya Peach (Key: [id number])  Rx #: [id number]  Wegovy 1MG/0.5ML auto-injectors     Form  [employer] Electronic Prior Authorization Form (2017 NCPDP)  Created  5 days ago  Sent to Plan  3 minutes ago  Plan Response  3 minutes ago  Submit Clinical Questions  less than a minute ago  Determination  Wait for Determination  Please wait for [employer] 2017 NCPDP to return a determination.

## 2024-07-31 ENCOUNTER — TELEPHONE (OUTPATIENT)
Dept: FAMILY MEDICINE CLINIC | Facility: CLINIC | Age: 34
End: 2024-07-31
Payer: COMMERCIAL

## 2024-07-31 DIAGNOSIS — E66.01 CLASS 3 SEVERE OBESITY DUE TO EXCESS CALORIES WITHOUT SERIOUS COMORBIDITY WITH BODY MASS INDEX (BMI) OF 45.0 TO 49.9 IN ADULT: ICD-10-CM

## 2024-07-31 NOTE — TELEPHONE ENCOUNTER
"THE PATIENT CALLED IN SAYING THAT HER MEDICATION KEEPS GETTING SENT TO THE WRONG PHARMACY   [facility] [id number] [facility] [address] [facility] [address] - [phone]  - [phone] FX   AND THAT Semaglutide IS NEEDING Prior Authorization HOWEVER THE PATIENT IS PAYING FOR THIS MEDICATION SHE STATED THAT HER INSURANCE DOES NOT COVER THIS MEDICATION.    THE CORRECT PHARMACY FOR THE MEDICATIONS TO BE SENT TO  IS [facility] \"COMPOUNDS ONLY\" - [facility] [address] [place] [address] - [phone] [facility] [phone] FX  "

## 2024-07-31 NOTE — TELEPHONE ENCOUNTER
I resent to [facility].  I can not remove the other pharmacy to make sure it is not accidentally sent to the wrong one because she has other medication going to the normal [facility].

## 2024-07-31 NOTE — TELEPHONE ENCOUNTER
This was sent to wrong pharmacy.  She is using the compound pharm, [facility].  Can you send it over please.

## 2024-08-05 DIAGNOSIS — E66.01 CLASS 3 SEVERE OBESITY DUE TO EXCESS CALORIES WITHOUT SERIOUS COMORBIDITY WITH BODY MASS INDEX (BMI) OF 45.0 TO 49.9 IN ADULT: ICD-10-CM

## 2024-08-05 NOTE — TELEPHONE ENCOUNTER
Resent to [facility]-  need to sign consent on next visit-  due for appointment around 9/25-

## 2024-08-19 ENCOUNTER — TELEPHONE (OUTPATIENT)
Dept: FAMILY MEDICINE CLINIC | Facility: CLINIC | Age: 34
End: 2024-08-19
Payer: COMMERCIAL

## 2024-08-19 DIAGNOSIS — E66.01 CLASS 3 SEVERE OBESITY DUE TO EXCESS CALORIES WITHOUT SERIOUS COMORBIDITY WITH BODY MASS INDEX (BMI) OF 45.0 TO 49.9 IN ADULT: ICD-10-CM

## 2024-08-19 NOTE — TELEPHONE ENCOUNTER
"  Caller: Nadiya Goddard    Relationship: Self    Best call back number: [phone]    What medication are you requesting:     Semaglutide-Weight Management        ave you had these symptoms before:    [x] Yes  [] No    Have you been treated for these symptoms before:   [x] Yes  [] No    If a prescription is needed, what is your preferred pharmacy and phone number: [facility] \"COMPOUNDS ONLY\" - [place], [place] - [address] [place] [address] [phone] [facility] [phone]      Additional notes: PATIENT CALLING TO GET THE NEXT STEP UP ON HER MEDICATION.    PLEASE SEND TO [facility].  PATIENT HAS SOME FOR APPROX TWO WEEKS BUT HAD TO RECEIVED SAME DOSAGE THAT HAD BEEN ALFREDA ND IS NOT QUITE AS EFFECTIVE.        "

## 2024-08-19 NOTE — TELEPHONE ENCOUNTER
Patient was notified that her dose increase has been sent. Patient voiced understanding and had no further quesitons

## 2024-08-21 ENCOUNTER — PRIOR AUTHORIZATION (OUTPATIENT)
Dept: FAMILY MEDICINE CLINIC | Facility: CLINIC | Age: 34
End: 2024-08-21
Payer: COMMERCIAL

## 2024-08-21 NOTE — TELEPHONE ENCOUNTER
Nadiya Goddard (Key: [id number])  Rx #: [id number]  Wegovy 1.7MG/0.75ML auto-injectors  Form  [employer] Electronic Prior Authorization Form (2017 NCPDP)

## 2024-09-20 ENCOUNTER — PRIOR AUTHORIZATION (OUTPATIENT)
Dept: FAMILY MEDICINE CLINIC | Facility: CLINIC | Age: 34
End: 2024-09-20
Payer: COMMERCIAL

## 2024-11-28 DIAGNOSIS — J30.9 ALLERGIC RHINITIS, UNSPECIFIED SEASONALITY, UNSPECIFIED TRIGGER: ICD-10-CM

## 2024-11-28 DIAGNOSIS — J45.909 ASTHMA, UNSPECIFIED ASTHMA SEVERITY, UNSPECIFIED WHETHER COMPLICATED, UNSPECIFIED WHETHER PERSISTENT: ICD-10-CM

## 2024-12-02 NOTE — TELEPHONE ENCOUNTER
Rx Refill Note  Requested Prescriptions     Pending Prescriptions Disp Refills    montelukast (SINGULAIR) 10 MG tablet [Pharmacy Med Name: MONTELUKAST SOD 10 MG TABLET] 30 tablet      Sig: TAKE ONE TABLET BY MOUTH ONCE NIGHTLY      Last office visit with prescribing clinician: 3/25/2024   Last telemedicine visit with prescribing clinician: Visit date not found   Next office visit with prescribing clinician: Visit date not found                         Would you like a call back once the refill request has been completed: [] Yes [] No    If the office needs to give you a call back, can they leave a voicemail: [] Yes [] No    Jason Yoon MA  12/02/24, 09:28 EST

## 2024-12-03 RX ORDER — MONTELUKAST SODIUM 10 MG/1
10 TABLET ORAL NIGHTLY
Qty: 30 TABLET | Refills: 2 | Status: SHIPPED | OUTPATIENT
Start: 2024-12-03

## 2025-04-07 ENCOUNTER — OFFICE VISIT (OUTPATIENT)
Dept: FAMILY MEDICINE CLINIC | Facility: CLINIC | Age: 35
End: 2025-04-07
Payer: COMMERCIAL

## 2025-04-07 VITALS
SYSTOLIC BLOOD PRESSURE: 112 MMHG | BODY MASS INDEX: 38.42 KG/M2 | DIASTOLIC BLOOD PRESSURE: 80 MMHG | WEIGHT: 230.6 LBS | TEMPERATURE: 97.2 F | HEART RATE: 67 BPM | HEIGHT: 65 IN | OXYGEN SATURATION: 99 %

## 2025-04-07 DIAGNOSIS — E66.813 CLASS 3 SEVERE OBESITY DUE TO EXCESS CALORIES WITHOUT SERIOUS COMORBIDITY WITH BODY MASS INDEX (BMI) OF 45.0 TO 49.9 IN ADULT: Primary | ICD-10-CM

## 2025-04-07 DIAGNOSIS — J30.9 ALLERGIC RHINITIS, UNSPECIFIED SEASONALITY, UNSPECIFIED TRIGGER: ICD-10-CM

## 2025-04-07 DIAGNOSIS — E66.01 CLASS 3 SEVERE OBESITY DUE TO EXCESS CALORIES WITHOUT SERIOUS COMORBIDITY WITH BODY MASS INDEX (BMI) OF 45.0 TO 49.9 IN ADULT: Primary | ICD-10-CM

## 2025-04-07 DIAGNOSIS — J45.909 ASTHMA, UNSPECIFIED ASTHMA SEVERITY, UNSPECIFIED WHETHER COMPLICATED, UNSPECIFIED WHETHER PERSISTENT: ICD-10-CM

## 2025-04-07 RX ORDER — MONTELUKAST SODIUM 10 MG/1
10 TABLET ORAL NIGHTLY
Qty: 30 TABLET | Refills: 2 | Status: SHIPPED | OUTPATIENT
Start: 2025-04-07

## 2025-04-07 NOTE — PROGRESS NOTES
Chief Complaint  Weight Loss, Med Refill, Allergies, and Asthma    Subjective        HPI   Nadiya presents to [facility] as a 34-year-old female for follow-up on chronic conditions, to refill medications and to discuss weight loss.  Overall doing very well    Allergies and asthma-well-controlled.  Uses albuterol inhaler rarely well-controlled.    No acute shortness of breath or wheezing in office today  She does take Zyrtec and montelukast daily.  Feels this controls her symptoms well  Plans to continue    She has been working hard on diet and exercise and unable to lose weight.  She would like to discuss weight loss medication.  This is not covered by her insurance.  She has no history personal or family of thyroid cancer, no personal history of pancreatitis  She would like to try Zepbound  She is interested in  self-pay program    She is not fasting today and declines labs she is not fasting today and declines labs    She has no other acute complaints    The following portions of the patient's history were reviewed and updated as appropriate: allergies, current medications, past family history, past medical history, past social history, past surgical history, and problem list       Review of Systems   Constitutional:  Negative for chills, fatigue and fever.   Eyes:  Negative for visual disturbance.   Respiratory:  Negative for cough, shortness of breath, wheezing and stridor.    Cardiovascular:  Negative for chest pain, palpitations and leg swelling.   Gastrointestinal:  Negative for abdominal pain, diarrhea, nausea and vomiting.   Endocrine: Negative for polydipsia, polyphagia and polyuria.   Skin:  Negative for rash.   Neurological:  Negative for dizziness.   Psychiatric/Behavioral:  Negative for self-injury, sleep disturbance and suicidal ideas. The patient is not nervous/anxious.         Objective   Vital Signs:   Vitals:    04/07/25 1425   BP: 112/80   Pulse: 67   Temp: 97.2 °F  "(36.2 °C)   SpO2: 99%   Weight: 105 kg (230 lb 9.6 oz)   Height: 165.1 cm (65\")   PainSc: 0-No pain            4/7/2025     2:26 PM   PHQ-2/PHQ-9 Depression Screening   Little interest or pleasure in doing things Not at all   Feeling down, depressed, or hopeless Not at all   How difficult have these problems made it for you to do your work, take care of things at home, or get along with other people? Not difficult at all                 Physical Exam  Vitals reviewed.   Constitutional:       General: She is not in acute distress.  HENT:      Nose: Congestion present.   Eyes:      Conjunctiva/sclera: Conjunctivae normal.   Neck:      Thyroid: No thyromegaly.      Vascular: No carotid bruit.   Cardiovascular:      Rate and Rhythm: Normal rate and regular rhythm.      Heart sounds: Normal heart sounds. No murmur heard.  Pulmonary:      Effort: Pulmonary effort is normal. No respiratory distress.      Breath sounds: Normal breath sounds. No stridor. No wheezing, rhonchi or rales.   Chest:      Chest wall: No tenderness.   Neurological:      Mental Status: She is alert and oriented to person, place, and time.   Psychiatric:         Attention and Perception: Attention normal.         Mood and Affect: Mood normal.          Result Review :     The following data was reviewed by: LV Santos on 04/07/2025:      Hemoglobin A1c (12/11/2023 14:04)  T3 (12/11/2023 14:04)  TSH (12/11/2023 14:04)  T4, Free (12/11/2023 14:04)  Lipid Panel (12/11/2023 14:04)  CBC & Differential (12/11/2023 14:04)  Comprehensive Metabolic Panel (12/11/2023 14:04)   Your blood sugar looks normal, you are not anemic, your kidney and liver enzymes look normal, your thyroid is normal     Your cholesterol/triglycerides are just slightly elevated   Continue to work on lower cholesterol diet and exercise as tolerated    Continue lifestyle modifications for high cholesterol.  Decrease/eliminate soda, caffeine, alcohol and overall caloric intake. " Reduce carbohydrates and sweets in diet.  Continue to improve dietary habits with lean proteins, fresh vegetables, fruits, and nuts. Improve aerobic exercise: walking/biking/swimming daily as tolerated, recommend 30 minutes/day at least.     We will continue to watch this annually     No other major concerns with your labs   Assessment and Plan       Class 3 severe obesity due to excess calories without serious comorbidity with body mass index (BMI) of 45.0 to 49.9 in adult  Patient's (Body mass index is 38.37 kg/m².) indicates that they are obese (BMI >30) with health conditions that include none . Weight is unchanged. BMI  is above average; BMI management plan is completed. We discussed portion control and increasing exercise.     Orders:    Tirzepatide-Weight Management (ZEPBOUND) 2.5 MG/0.5ML solution auto-injector; Inject 0.5 mL under the skin into the appropriate area as directed 1 (One) Time Per Week for 90 days.    Asthma, unspecified asthma severity, unspecified whether complicated, unspecified whether persistent  Asthma is stable.  The patient is experiencing monthly daytime asthma symptoms and she is experiencing no nighttime asthma symptoms.    Plan: Continue same medication/s without change.    Discussed medication dosage, use, side effects, and goals of treatment in detail.    Discussed distinction between quick-relief and maintenance control medications.  Discussed monitoring symptoms and use of quick-relief medications and contacting provider early in the course of exacerbations.  Warning signs of respiratory distress were reviewed with the patient.     Patient Treatment Goals: symptom prevention, minimizing limitation in activity, prevention of exacerbations and use of ER/inpatient care, maintenance of optimal pulmonary function, and minimization of adverse effects of treatment.    Followup in 6 months.            Orders:    montelukast (SINGULAIR) 10 MG tablet; Take 1 tablet by mouth Every  Night.    Allergic rhinitis, unspecified seasonality, unspecified trigger  Continue Zyrtec and montelukast working well control symptoms  Orders:    montelukast (SINGULAIR) 10 MG tablet; Take 1 tablet by mouth Every Night.          Follow Up   Return in about 6 months (around 10/7/2025) for Annual physical.  Patient was given instructions and counseling regarding her condition or for health maintenance advice. Please see specific information pulled into the AVS if appropriate.

## 2025-04-07 NOTE — ASSESSMENT & PLAN NOTE
Asthma is stable.  The patient is experiencing monthly daytime asthma symptoms and she is experiencing no nighttime asthma symptoms.    Plan: Continue same medication/s without change.    Discussed medication dosage, use, side effects, and goals of treatment in detail.    Discussed distinction between quick-relief and maintenance control medications.  Discussed monitoring symptoms and use of quick-relief medications and contacting provider early in the course of exacerbations.  Warning signs of respiratory distress were reviewed with the patient.     Patient Treatment Goals: symptom prevention, minimizing limitation in activity, prevention of exacerbations and use of ER/inpatient care, maintenance of optimal pulmonary function, and minimization of adverse effects of treatment.    Followup in 6 months.            Orders:    montelukast (SINGULAIR) 10 MG tablet; Take 1 tablet by mouth Every Night.

## 2025-04-08 RX ORDER — TIRZEPATIDE 2.5 MG/.5ML
2.5 INJECTION, SOLUTION SUBCUTANEOUS WEEKLY
Qty: 2 ML | Refills: 2 | Status: SHIPPED | OUTPATIENT
Start: 2025-04-08 | End: 2025-07-07

## 2025-06-18 RX ORDER — TIRZEPATIDE 2.5 MG/.5ML
INJECTION, SOLUTION SUBCUTANEOUS
Qty: 2 ML | Refills: 2 | Status: SHIPPED | OUTPATIENT
Start: 2025-06-18

## 2025-06-18 NOTE — TELEPHONE ENCOUNTER
Rx Refill Note  Requested Prescriptions     Pending Prescriptions Disp Refills    Zepbound 2.5 MG/0.5ML solution [Pharmacy Med Name: ZEPBOUND 2.5 MG/0.5ML SUBCUTANEOUS SOLUTION] 2 mL 2     Sig: INJECT 0.5 ML (2.5 MG) UNDER THE SKIN ONCE WEEKLY (0.5ML= 50 UNITS)      Last office visit with prescribing clinician: 4/7/2025   Last telemedicine visit with prescribing clinician: Visit date not found   Next office visit with prescribing clinician: 10/7/2025       GLP-1 Agonist Protocol [id number]/18/2025 09:38 AM   Protocol Details Lipid profile in last 12 months    Most recent eGFR is above 30 in the past 12 months.    A1c in last six months          SANDRA Roy(R)  06/18/25, 10:16 EDT

## 2025-06-26 ENCOUNTER — TELEPHONE (OUTPATIENT)
Dept: FAMILY MEDICINE CLINIC | Facility: CLINIC | Age: 35
End: 2025-06-26
Payer: COMMERCIAL

## 2025-06-26 DIAGNOSIS — E66.813 CLASS 3 SEVERE OBESITY DUE TO EXCESS CALORIES WITHOUT SERIOUS COMORBIDITY WITH BODY MASS INDEX (BMI) OF 45.0 TO 49.9 IN ADULT: Primary | ICD-10-CM

## 2025-06-26 RX ORDER — TIRZEPATIDE 5 MG/.5ML
5 INJECTION, SOLUTION SUBCUTANEOUS WEEKLY
Qty: 2 ML | Refills: 1 | Status: SHIPPED | OUTPATIENT
Start: 2025-06-26

## 2025-06-26 NOTE — TELEPHONE ENCOUNTER
Caller: Nadiya Goddard    Relationship to patient: Self    Best call back number:[phone]      Patient is needing: SHE IS READY TO GO UP TO THE NEXT DOSE OF ZEPBOUND.  PLEASE SEND IT TO       [facility] - [place], [place] - [address] [place] [address]  - [phone]  - [phone] FX

## 2025-07-06 DIAGNOSIS — J30.9 ALLERGIC RHINITIS, UNSPECIFIED SEASONALITY, UNSPECIFIED TRIGGER: ICD-10-CM

## 2025-07-06 DIAGNOSIS — J45.909 ASTHMA, UNSPECIFIED ASTHMA SEVERITY, UNSPECIFIED WHETHER COMPLICATED, UNSPECIFIED WHETHER PERSISTENT: ICD-10-CM

## 2025-07-07 RX ORDER — MONTELUKAST SODIUM 10 MG/1
10 TABLET ORAL NIGHTLY
Qty: 30 TABLET | Refills: 2 | Status: SHIPPED | OUTPATIENT
Start: 2025-07-07

## 2025-07-07 NOTE — TELEPHONE ENCOUNTER
Rx Refill Note  Requested Prescriptions     Pending Prescriptions Disp Refills    montelukast (SINGULAIR) 10 MG tablet [Pharmacy Med Name: MONTELUKAST SOD 10 MG TABLET] 30 tablet 2     Sig: TAKE 1 TABLET BY MOUTH ONCE NIGHTLY      Last office visit with prescribing clinician: 4/7/2025   Last telemedicine visit with prescribing clinician: Visit date not found   Next office visit with prescribing clinician: 10/7/2025   {      SANDRA Roy(R)  07/07/25, 10:34 EDT

## 2025-07-14 ENCOUNTER — TELEPHONE (OUTPATIENT)
Dept: FAMILY MEDICINE CLINIC | Facility: CLINIC | Age: 35
End: 2025-07-14
Payer: COMMERCIAL

## 2025-07-14 NOTE — TELEPHONE ENCOUNTER
Caller: Nadiya Goddard    Relationship: Self    Best call back number: [phone]     What is the best time to reach you: ANY    Who are you requesting to speak with (clinical staff, provider,  specific staff member): CLINICAL STAFF     Do you know the name of the person who called:      What was the call regarding: PATIENT HAS REQUESTED TO HAVE HER ZEPBOUND MEDICATION SWITCHED BACK  TO THE SEMAGLUTIDE COMPOUND.  PATIENT IS REQUESTING TO THAT THAT PRESCRIPTION SENT TO THE [facility].    Is it okay if the provider responds through [facility]: YES